# Patient Record
Sex: MALE | Race: WHITE
[De-identification: names, ages, dates, MRNs, and addresses within clinical notes are randomized per-mention and may not be internally consistent; named-entity substitution may affect disease eponyms.]

---

## 2017-12-03 ENCOUNTER — HOSPITAL ENCOUNTER (EMERGENCY)
Dept: HOSPITAL 52 - LL.ED | Age: 67
Discharge: HOME | End: 2017-12-03
Payer: COMMERCIAL
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
## 2017-12-03 DIAGNOSIS — S06.0X0A: Primary | ICD-10-CM

## 2017-12-03 DIAGNOSIS — S00.83XA: ICD-10-CM

## 2017-12-03 DIAGNOSIS — S01.312A: ICD-10-CM
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
## 2022-05-01 ENCOUNTER — HOSPITAL ENCOUNTER (EMERGENCY)
Dept: HOSPITAL 52 - LL.ED | Age: 72
Discharge: NURSING, REHAB OR LONG-TERM CARE | End: 2022-05-01
Payer: MEDICARE

## 2022-05-01 DIAGNOSIS — E78.00: ICD-10-CM

## 2022-05-01 DIAGNOSIS — E66.9: ICD-10-CM

## 2022-05-01 DIAGNOSIS — Z79.899: ICD-10-CM

## 2022-05-01 DIAGNOSIS — I10: ICD-10-CM

## 2022-05-01 DIAGNOSIS — I44.2: Primary | ICD-10-CM

## 2022-05-01 DIAGNOSIS — Z79.82: ICD-10-CM

## 2022-05-01 PROCEDURE — 84100 ASSAY OF PHOSPHORUS: CPT

## 2022-05-01 PROCEDURE — 85025 COMPLETE CBC W/AUTO DIFF WBC: CPT

## 2022-05-01 PROCEDURE — 36415 COLL VENOUS BLD VENIPUNCTURE: CPT

## 2022-05-01 PROCEDURE — 99285 EMERGENCY DEPT VISIT HI MDM: CPT

## 2022-05-01 PROCEDURE — 80053 COMPREHEN METABOLIC PANEL: CPT

## 2022-05-01 PROCEDURE — 93005 ELECTROCARDIOGRAM TRACING: CPT

## 2022-05-01 PROCEDURE — 82803 BLOOD GASES ANY COMBINATION: CPT

## 2022-05-01 PROCEDURE — 96374 THER/PROPH/DIAG INJ IV PUSH: CPT

## 2022-05-01 PROCEDURE — 70450 CT HEAD/BRAIN W/O DYE: CPT

## 2022-05-01 PROCEDURE — 84484 ASSAY OF TROPONIN QUANT: CPT

## 2022-05-01 PROCEDURE — 96375 TX/PRO/DX INJ NEW DRUG ADDON: CPT

## 2022-05-01 PROCEDURE — 83735 ASSAY OF MAGNESIUM: CPT

## 2022-05-02 LAB
ANION GAP SERPL CALC-SCNC: 33.8 MEQ/L (ref 7–15)
BASE EXCESS BLDV CALC-SCNC: -13 MMOL/L (ref ?–3)
CHLORIDE SERPL-SCNC: 98 MMOL/L (ref 98–107)
GAS FLOW.O2 O2 DELIVERY SYS: 15 L/MIN
HCO3 BLDV-SCNC: 13 MMOL/L (ref 23–28)
INHALED O2 MECHANISM DOSE: (no result)
PCO2 BLDV: 33 MMHG (ref 41–51)
PH BLDV: 7.22 [PH] (ref 7.31–7.41)
PO2 BLDV: 57 MMHG
SAO2 % BLDV: 84 %
SODIUM SERPL-SCNC: 141 MMOL/L (ref 136–145)

## 2023-07-14 ENCOUNTER — HOSPITAL ENCOUNTER (EMERGENCY)
Dept: HOSPITAL 52 - LL.ED | Age: 73
Discharge: HOME | End: 2023-07-14
Payer: MEDICARE

## 2023-07-14 DIAGNOSIS — Z79.82: ICD-10-CM

## 2023-07-14 DIAGNOSIS — E78.00: ICD-10-CM
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
## 2023-07-14 DIAGNOSIS — E66.9: ICD-10-CM

## 2023-07-14 DIAGNOSIS — Z79.899: ICD-10-CM

## 2023-07-14 DIAGNOSIS — S60.221A: Primary | ICD-10-CM

## 2023-07-14 DIAGNOSIS — I10: ICD-10-CM
